# Patient Record
Sex: FEMALE | Race: WHITE | NOT HISPANIC OR LATINO | Employment: OTHER | URBAN - METROPOLITAN AREA
[De-identification: names, ages, dates, MRNs, and addresses within clinical notes are randomized per-mention and may not be internally consistent; named-entity substitution may affect disease eponyms.]

---

## 2017-01-16 PROBLEM — Z91.81 RISK FOR FALLS: Status: ACTIVE | Noted: 2017-01-16

## 2017-04-04 PROBLEM — D49.4 BLADDER TUMOR: Status: ACTIVE | Noted: 2017-04-04

## 2017-04-04 PROBLEM — N32.9 LESION OF BLADDER: Status: ACTIVE | Noted: 2017-04-04

## 2017-04-04 PROBLEM — R31.21 ASYMPTOMATIC MICROSCOPIC HEMATURIA: Status: ACTIVE | Noted: 2017-04-04

## 2019-01-29 PROBLEM — M75.102 ROTATOR CUFF SYNDROME OF LEFT SHOULDER: Status: ACTIVE | Noted: 2019-01-29

## 2019-04-23 PROBLEM — F33.41 RECURRENT MAJOR DEPRESSIVE DISORDER, IN PARTIAL REMISSION: Status: ACTIVE | Noted: 2019-04-23

## 2019-05-02 PROBLEM — R91.1 NODULE OF LOWER LOBE OF RIGHT LUNG: Status: ACTIVE | Noted: 2019-05-02

## 2019-05-02 PROBLEM — K57.30 DIVERTICULOSIS OF COLON: Status: ACTIVE | Noted: 2019-05-02

## 2019-05-02 PROBLEM — N13.30 HYDRONEPHROSIS OF RIGHT KIDNEY: Status: ACTIVE | Noted: 2019-05-02

## 2019-06-06 PROBLEM — K56.1 INTUSSUSCEPTION OF SMALL BOWEL: Status: ACTIVE | Noted: 2019-06-06

## 2019-06-06 PROBLEM — R10.32 CHRONIC LEFT LOWER QUADRANT PAIN: Status: ACTIVE | Noted: 2019-06-06

## 2019-06-06 PROBLEM — G89.29 CHRONIC LEFT LOWER QUADRANT PAIN: Status: ACTIVE | Noted: 2019-06-06

## 2019-12-23 PROBLEM — R42 VERTIGO: Status: ACTIVE | Noted: 2019-12-23

## 2020-01-12 PROBLEM — R90.89 ABNORMAL FINDING ON MRI OF BRAIN: Status: ACTIVE | Noted: 2020-01-12

## 2020-01-28 ENCOUNTER — OFFICE VISIT (OUTPATIENT)
Dept: ENDOCRINOLOGY | Facility: CLINIC | Age: 80
End: 2020-01-28
Payer: MEDICARE

## 2020-01-28 ENCOUNTER — LAB VISIT (OUTPATIENT)
Dept: LAB | Facility: HOSPITAL | Age: 80
End: 2020-01-28
Attending: INTERNAL MEDICINE
Payer: MEDICARE

## 2020-01-28 VITALS
SYSTOLIC BLOOD PRESSURE: 142 MMHG | DIASTOLIC BLOOD PRESSURE: 84 MMHG | BODY MASS INDEX: 22.41 KG/M2 | OXYGEN SATURATION: 97 % | WEIGHT: 118.69 LBS | HEIGHT: 61 IN | HEART RATE: 73 BPM

## 2020-01-28 DIAGNOSIS — E21.3 HYPERPARATHYROIDISM: ICD-10-CM

## 2020-01-28 DIAGNOSIS — F17.200 TOBACCO DEPENDENCE: Chronic | ICD-10-CM

## 2020-01-28 DIAGNOSIS — E55.9 VITAMIN D DEFICIENCY: ICD-10-CM

## 2020-01-28 DIAGNOSIS — M81.0 AGE-RELATED OSTEOPOROSIS WITHOUT CURRENT PATHOLOGICAL FRACTURE: Primary | ICD-10-CM

## 2020-01-28 DIAGNOSIS — M81.0 AGE-RELATED OSTEOPOROSIS WITHOUT CURRENT PATHOLOGICAL FRACTURE: ICD-10-CM

## 2020-01-28 PROBLEM — R90.89 ABNORMAL FINDING ON MRI OF BRAIN: Chronic | Status: ACTIVE | Noted: 2020-01-12

## 2020-01-28 PROCEDURE — 1159F PR MEDICATION LIST DOCUMENTED IN MEDICAL RECORD: ICD-10-PCS | Mod: S$GLB,,, | Performed by: INTERNAL MEDICINE

## 2020-01-28 PROCEDURE — 1126F AMNT PAIN NOTED NONE PRSNT: CPT | Mod: S$GLB,,, | Performed by: INTERNAL MEDICINE

## 2020-01-28 PROCEDURE — 83519 RIA NONANTIBODY: CPT

## 2020-01-28 PROCEDURE — 99999 PR PBB SHADOW E&M-EST. PATIENT-LVL IV: ICD-10-PCS | Mod: PBBFAC,,, | Performed by: INTERNAL MEDICINE

## 2020-01-28 PROCEDURE — 1126F PR PAIN SEVERITY QUANTIFIED, NO PAIN PRESENT: ICD-10-PCS | Mod: S$GLB,,, | Performed by: INTERNAL MEDICINE

## 2020-01-28 PROCEDURE — 3077F SYST BP >= 140 MM HG: CPT | Mod: CPTII,S$GLB,, | Performed by: INTERNAL MEDICINE

## 2020-01-28 PROCEDURE — 99204 PR OFFICE/OUTPT VISIT, NEW, LEVL IV, 45-59 MIN: ICD-10-PCS | Mod: S$GLB,,, | Performed by: INTERNAL MEDICINE

## 2020-01-28 PROCEDURE — 1101F PT FALLS ASSESS-DOCD LE1/YR: CPT | Mod: CPTII,S$GLB,, | Performed by: INTERNAL MEDICINE

## 2020-01-28 PROCEDURE — 82523 COLLAGEN CROSSLINKS: CPT | Mod: 91

## 2020-01-28 PROCEDURE — 99204 OFFICE O/P NEW MOD 45 MIN: CPT | Mod: S$GLB,,, | Performed by: INTERNAL MEDICINE

## 2020-01-28 PROCEDURE — 82523 COLLAGEN CROSSLINKS: CPT

## 2020-01-28 PROCEDURE — 3079F PR MOST RECENT DIASTOLIC BLOOD PRESSURE 80-89 MM HG: ICD-10-PCS | Mod: CPTII,S$GLB,, | Performed by: INTERNAL MEDICINE

## 2020-01-28 PROCEDURE — 1101F PR PT FALLS ASSESS DOC 0-1 FALLS W/OUT INJ PAST YR: ICD-10-PCS | Mod: CPTII,S$GLB,, | Performed by: INTERNAL MEDICINE

## 2020-01-28 PROCEDURE — 36415 COLL VENOUS BLD VENIPUNCTURE: CPT | Mod: PO

## 2020-01-28 PROCEDURE — 3079F DIAST BP 80-89 MM HG: CPT | Mod: CPTII,S$GLB,, | Performed by: INTERNAL MEDICINE

## 2020-01-28 PROCEDURE — 3077F PR MOST RECENT SYSTOLIC BLOOD PRESSURE >= 140 MM HG: ICD-10-PCS | Mod: CPTII,S$GLB,, | Performed by: INTERNAL MEDICINE

## 2020-01-28 PROCEDURE — 99999 PR PBB SHADOW E&M-EST. PATIENT-LVL IV: CPT | Mod: PBBFAC,,, | Performed by: INTERNAL MEDICINE

## 2020-01-28 PROCEDURE — 1159F MED LIST DOCD IN RCRD: CPT | Mod: S$GLB,,, | Performed by: INTERNAL MEDICINE

## 2020-01-28 RX ORDER — ALENDRONATE SODIUM 70 MG/1
70 TABLET ORAL
Qty: 12 TABLET | Refills: 6 | Status: SHIPPED | OUTPATIENT
Start: 2020-01-28 | End: 2021-06-28

## 2020-01-28 RX ORDER — ERGOCALCIFEROL 1.25 MG/1
CAPSULE ORAL
Qty: 25 CAPSULE | OUTPATIENT
Start: 2020-01-28

## 2020-01-28 RX ORDER — ERGOCALCIFEROL 1.25 MG/1
CAPSULE ORAL
Qty: 16 CAPSULE | Refills: 0 | Status: SHIPPED | OUTPATIENT
Start: 2020-01-28 | End: 2021-08-05 | Stop reason: CLARIF

## 2020-01-28 NOTE — PATIENT INSTRUCTIONS
Start vitamin D twice a week.    Start Tums 750 mg (one tablet) daily.    Start Fosamax in 2 weeks.    Get repeat vitamin d level in 2 months.    Do 24 hour urine test.

## 2020-01-28 NOTE — ASSESSMENT & PLAN NOTE
Spent >3 minutes counseling pt on the negative health effects of smoking. She is not ready to quit. Encouraged smoking cessation.

## 2020-01-28 NOTE — ASSESSMENT & PLAN NOTE
Risks factors include  race, menopause, smoking, ETOH use, and hyperparathyroidism.  In the past, pt not compliant with PO Fosamax. Discussed importance of adherence.   Will resume alendronate. If nono-compliance is an issue in the future, discussed IV reclast.  Discussed how to take the medication. Pt should take in am on an empty stomach (no food or other meds) with full glass of water and remain upright for at least 30 minutes.   Discussed calcium (8081-7518 mg daily, calcium from food sources preferred ) + Vitamin D 50KBID for 8 weeks and weight bearing/muscle strengthening exercises.   Discussed fall precautions.   Discussed side effects of bisphosphonates including but not limited to:  - GERD, esophagitis  - ONJ- 1 in 10,000-100,000 patient years. Risk seen in long term use  - AFF: 100 per 100,000 patient years with longer term use. Call with thigh or groin pain  Also discussed doing a drug holiday after usually 5 or 10 years.   Alerted that if dental work needs to be done it should be done prior to initiating therapy. She notes a bottom tooth which has been hurting with cold/hot sensitivity.

## 2020-01-28 NOTE — ASSESSMENT & PLAN NOTE
Start ergocalciferol BID for 8 week. Afterwards, start cholecalciferol 2000 IU daily. Recheck levels in 2 months

## 2020-01-28 NOTE — PROGRESS NOTES
Subjective:    Patient ID:  Camila Steve is a 79 y.o. female.    Chief Complaint:  Hyperparathyroidism and Osteoporosis      Pt presents to establish care for osteoporosis and vitamin D deficiency.    For osteoporosis, she took Fosamax in the past. Recalls taking Fosamax in the past. Stopped taking b/c she ran out and did not get a refill. No side effects while on the medication. Notes she occasionally has GERD symptoms now. No history kidney stones. History of multiple fractures: fractured wrist in 3 places after falling down some steps in the rain, broke R ankle a few times but did not require surgery. Has muscular dystrophy and trips often. Does not take calcium. Eats cheese but dose not consume a diet high in calcium. Notes she is lactose intolerant. Not currently taking vitamin D. Does not like to take pills. Smokes for >50 years. She likes it and does not want to cut back. FH of osteoporosis in mom.      Review of Systems   Constitutional: Negative for unexpected weight change.   Eyes: Positive for visual disturbance.        H/o cataracts with intermittent blurred vision.   Respiratory: Positive for shortness of breath.    Cardiovascular: Negative for chest pain.   Gastrointestinal: Positive for abdominal pain.   Musculoskeletal: Negative for myalgias.   Skin: Negative for wound.   Neurological: Positive for numbness. Negative for headaches.   Hematological: Does not bruise/bleed easily.   Psychiatric/Behavioral: Negative for sleep disturbance.        Past Medical History:   Diagnosis Date    Blood pressure elevated without history of HTN     Cancer     renal cell    Cervicalgia     Charcot-Khadijah-Tooth disease     COPD (chronic obstructive pulmonary disease)     Depression     Emphysema (subcutaneous) (surgical) resulting from a procedure     Esophageal reflux     Foot pain     High cholesterol     Hyperlipidemia     Hypertension     Low back pain     Malaise and fatigue     Osteopenia   "   Pleurisy     Pneumonia     Rhinitis, allergic     Snoring     Tobacco abuse     Vibrio cholera infection     Wrist fracture, left 12/2016    patient slipped on steps and fx (L) wrist      Social History     Tobacco Use    Smoking status: Current Every Day Smoker     Packs/day: 0.75     Years: 50.00     Pack years: 37.50     Types: Cigarettes    Smokeless tobacco: Never Used   Substance Use Topics    Alcohol use: Yes     Alcohol/week: 14.0 standard drinks     Types: 14 Shots of liquor per week     Frequency: 4 or more times a week     Drinks per session: 3 or 4    Drug use: Never     Family History   Problem Relation Age of Onset    Osteoporosis Mother     Breast cancer Maternal Cousin 35    Cancer Maternal Cousin       Past Surgical History:   Procedure Laterality Date    APPENDECTOMY      COLONOSCOPY      DIAGNOSTIC LAPAROSCOPY N/A 6/6/2019    Procedure: LAPAROSCOPY, DIAGNOSTIC;  Surgeon: Adelfo Bera MD;  Location: [facility];  Service: General;  Laterality: N/A;    HYSTERECTOMY      Partial @ age 48    KIDNEY SURGERY Left 06/2017    removal of cancerous tumor          Current Outpatient Medications:     lisinopril 10 MG tablet, TAKE 1 TABLET(10 MG) BY MOUTH EVERY DAY, Disp: 30 tablet, Rfl: 11    meclizine (ANTIVERT) 25 mg tablet, 1/2-1 tid prn dizziness, Disp: 90 tablet, Rfl: 1    rosuvastatin (CRESTOR) 40 MG Tab, Take 1 tablet (40 mg total) by mouth once daily., Disp: 30 tablet, Rfl: 11    sertraline (ZOLOFT) 100 MG tablet, GIVE "ZOEY" 2 TABLETS BY MOUTH ONCE DAILY, Disp: 60 tablet, Rfl: 11    traZODone (DESYREL) 50 MG tablet, TAKE 1 TABLET BY MOUTH EVERY NIGHT AT BEDTIME FOR DEPRESSION AND REST, Disp: 90 tablet, Rfl: 3    albuterol 90 mcg/actuation inhaler, Inhale 2 puffs into the lungs every 6 (six) hours as needed. (Patient not taking: Reported on 1/28/2020), Disp: 1 Inhaler, Rfl: 11    alendronate (FOSAMAX) 70 MG tablet, Take 1 tablet (70 mg total) by mouth every 7 days. Take " "with a full glass of water & remain upright for at least 30 minutes, Disp: 12 tablet, Rfl: 6    ALPRAZolam (XANAX) 0.25 MG tablet, Take 6 tab 45 min before mri, and after sign paperwork. (Patient not taking: Reported on 1/28/2020), Disp: 10 tablet, Rfl: 0    calcium carbonate (TUMS) 300 mg (750 mg) Chew, Take 1 tablet by mouth once daily., Disp: , Rfl:     ergocalciferol (ERGOCALCIFEROL) 50,000 unit Cap, Take one tablet twice weekly (ie Monday and Friday) for 8 weeks., Disp: 16 capsule, Rfl: 0    HYDROcodone-acetaminophen (NORCO) 5-325 mg per tablet, Take 1 tablet by mouth every 6 (six) hours as needed. (Patient not taking: Reported on 1/28/2020), Disp: 20 tablet, Rfl: 0    mupirocin calcium 2% nasl oint (BACTROBAN) 2 % Oint, by Nasal route 2 (two) times daily., Disp: , Rfl:     ondansetron (ZOFRAN-ODT) 8 MG TbDL, Take 1 tablet (8 mg total) by mouth every 6 (six) hours as needed. (Patient not taking: Reported on 1/28/2020), Disp: 10 tablet, Rfl: 0    Current Facility-Administered Medications:     ceFOXItin 2 g/50ml Dextrose IVPB, 2 g, Intravenous, On Call Procedure, Adelfo Bear MD     Review of patient's allergies indicates:   Allergen Reactions    Amoxicillin Itching     itching    Bactrim [sulfamethoxazole-trimethoprim] Other (See Comments)    Ciprofloxacin Other (See Comments)    Doxycycline Other (See Comments)    Levaquin [levofloxacin] Other (See Comments)    Penicillins Itching    Percocet [oxycodone-acetaminophen] Anxiety        Objective:   BP (!) 142/84   Pulse 73   Ht 5' 1" (1.549 m)   Wt 53.8 kg (118 lb 11.5 oz)   SpO2 97%   BMI 22.43 kg/m²   BP Readings from Last 3 Encounters:   01/28/20 (!) 142/84   01/14/20 118/72   12/03/19 (!) 152/86     Wt Readings from Last 3 Encounters:   01/28/20 1015 53.8 kg (118 lb 11.5 oz)   01/14/20 1359 53.5 kg (118 lb)   01/08/20 1302 51.7 kg (114 lb)          Physical Exam   Constitutional: She is oriented to person, place, and time. She appears " well-developed. No distress.   HENT:   Head: Normocephalic and atraumatic.   Nose: Nose normal.   Mouth/Throat: Oropharynx is clear and moist.   Neck: No tracheal deviation present. No thyromegaly present.   Cardiovascular: Normal rate, regular rhythm and normal heart sounds.   No murmur heard.  Pulmonary/Chest: Effort normal and breath sounds normal. She has no wheezes. She has no rales.   Abdominal: Soft. Bowel sounds are normal.   Musculoskeletal: She exhibits no edema.   Lymphadenopathy:     She has no cervical adenopathy.   Neurological: She is alert and oriented to person, place, and time. No cranial nerve deficit.   Skin: Skin is warm.   Psychiatric: She has a normal mood and affect. Thought content normal.   Vitals reviewed.        Lab Results   Component Value Date     06/06/2019    K 4.2 06/06/2019     06/06/2019    CO2 27 06/06/2019    BUN 19 (H) 06/06/2019    CREATININE 0.49 (L) 12/19/2019    GLU 99 06/06/2019    AST 35 05/02/2019    ALT 29 05/02/2019    ALBUMIN 4.5 05/02/2019    PROT 7.1 05/02/2019    BILITOT 0.5 05/02/2019    WBC 5.91 06/06/2019    HGB 13.8 06/06/2019    HCT 41.0 06/06/2019    MCV 87 06/06/2019    MCH 29.3 06/06/2019     06/06/2019    MPV 9.3 06/06/2019    GRAN 3.7 05/02/2019    GRAN 60.5 05/02/2019    LYMPH 1.8 05/02/2019    LYMPH 29.4 05/02/2019    CHOL 168 04/23/2019    HDL 92 (H) 04/23/2019    LDLCALC 59.4 (L) 04/23/2019    TRIG 83 04/23/2019       No results found for: TSH, A2GSQAH, W1ZCAPG, FREET4, THYROIDAB     Thyroid Labs Latest Ref Rng & Units 5/2/2019 6/6/2019 12/19/2019   Sodium 136 - 145 mmol/L 137 142 -   Potassium 3.5 - 5.1 mmol/L 4.4 4.2 -   Chloride 95 - 110 mmol/L 101 104 -   Carbon Dioxide 22 - 31 mmol/L 29 27 -   Glucose 70 - 110 mg/dL 93 99 -   Blood Urea Nitrogen 7 - 18 mg/dL 12 19(H) -   Creatinine 0.50 - 1.40 mg/dL 0.49(L) 0.54 0.49(L)   Calcium 8.4 - 10.2 mg/dL 9.1 9.3 9.2   Total Protein 6.0 - 8.4 g/dL 7.1 - -   Albumin 3.5 - 5.2 g/dL 4.5 -  -   Total Bilirubin 0.2 - 1.3 mg/dL 0.5 - -   AST 14 - 36 U/L 35 - -   ALT 10 - 44 U/L 29 - -   Anion Gap 8 - 16 mmol/L 7(L) 11 -   eGFR (African American) >60 mL/min/1.73 m:2 >60 >60 >60   eGFR (Non-African American) >60 mL/min/1.73 m:2 >60 >60 >60   WBC 3.90 - 12.70 K/uL - 5.91 -   RBC 4.00 - 5.40 M/uL - 4.71 -   Hemoglobin 12.0 - 16.0 g/dL - 13.8 -   Hematocrit 37.0 - 48.5 % - 41.0 -   MCV 82 - 98 fL - 87 -   MCH 27.0 - 31.0 pg - 29.3 -   MCHC 32.0 - 36.0 g/dL - 33.7 -   RDW 11.5 - 14.5 % - 13.1 -   Platelets 150 - 350 K/uL - 172 -   MPV 9.2 - 12.9 fL - 9.3 -   Gran # 1.8 - 7.7 K/uL - - -   Lymph # 1.0 - 4.8 K/uL - - -   Mono # 0.3 - 1.0 K/uL - - -   Eos # 0.0 - 0.5 K/uL - - -   Baso # 0.00 - 0.20 K/uL - - -   Gran % 38.0 - 73.0 % - - -   Lymph % 18.0 - 48.0 % - - -   Mono% 4.0 - 15.0 % - - -   Eos % 0.0 - 8.0 % - - -   Baso % 0.0 - 1.9 % - - -   INR - - 1.0 -   aPTT 24.6 - 36.7 sec - 32.3 -        DXA Bone Density Spine And Hip   Order: [id number]   Status:  Final result   Visible to patient:  No (Not Released) Next appt:  01/30/2020 at 10:30 AM in Radiology ([facility]) Dx:  Osteoporosis, unspecified osteoporosi...   Details     Reading Physician Reading Date Result Priority   Joselo Deluna MD 1/18/2019       Narrative     EXAMINATION:  DEXA BONE DENSITY SPINE HIP01/18/2019    CLINICAL HISTORY:  Screening, hysterectomy      DEXA images are obtained. FRAX measurement is provided.    COMPARISON:  07/01/2016 although with different technique    FINDINGS:  Spine bone mineral density is 0.791 g/cm 2 with T-score -2.3 and Z-score 0.2.  This compares to previous bone mineral density measurement of 0.864 and T-score of -2.6.    Femoral bone mineral density measurement is 0.624 g/cm 2 with T-score -2.6 and Z-score 0.7.  This compares to previous bone mineral density measurement of 0.705 and T-score of -2.4.    FRAX measurement is provided of 10 year major osteoporotic fracture 45 % and hip fracture 36 %.       Impression       Bone mineral density measurements correspond to osteoporotic overall category.  Fracture risk is high.  In the interval, both bone mineral density measurements are slightly decreased.             Assessment and plan:       Problem List Items Addressed This Visit        Endocrine    Vitamin D deficiency (Chronic)    Current Assessment & Plan     Start ergocalciferol BID for 8 week. Afterwards, start cholecalciferol 2000 IU daily. Recheck levels in 2 months         Relevant Medications    ergocalciferol (ERGOCALCIFEROL) 50,000 unit Cap    Hyperparathyroidism (Chronic)    Current Assessment & Plan     ? Primary vs secondary. PTH mildly elevated and pt normocalcemic. Vitamin D is low. Replace vitamin D and monitor PTH. Check 24 hour urine calcium. Recheck in 6 months.         Relevant Medications    ergocalciferol (ERGOCALCIFEROL) 50,000 unit Cap       Orthopedic    Age-related osteoporosis without current pathological fracture - Primary    Current Assessment & Plan     Risks factors include  race, menopause, smoking, ETOH use, and hyperparathyroidism.  In the past, pt not compliant with PO Fosamax. Discussed importance of adherence.   Will resume alendronate. If nono-compliance is an issue in the future, discussed IV reclast.  Discussed how to take the medication. Pt should take in am on an empty stomach (no food or other meds) with full glass of water and remain upright for at least 30 minutes.   Discussed calcium (4162-9910 mg daily, calcium from food sources preferred ) + Vitamin D 50KBID for 8 weeks and weight bearing/muscle strengthening exercises.   Discussed fall precautions.   Discussed side effects of bisphosphonates including but not limited to:  - GERD, esophagitis  - ONJ- 1 in 10,000-100,000 patient years. Risk seen in long term use  - AFF: 100 per 100,000 patient years with longer term use. Call with thigh or groin pain  Also discussed doing a drug holiday after usually 5 or 10  years.   Alerted that if dental work needs to be done it should be done prior to initiating therapy. She notes a bottom tooth which has been hurting with cold/hot sensitivity.         Relevant Medications    ergocalciferol (ERGOCALCIFEROL) 50,000 unit Cap    alendronate (FOSAMAX) 70 MG tablet    Other Relevant Orders    C Telopeptide (CTX), Serum    Calcium, Timed Urine    N-Telopeptide, Serum    Procollagen Type 1 Propeptide    Creatinine, urine, timed 24 Hours    Vitamin D       Other    Tobacco dependence (Chronic)    Current Assessment & Plan     Spent >3 minutes counseling pt on the negative health effects of smoking. She is not ready to quit. Encouraged smoking cessation.              Follow up:  Get repeat vitamin d level in 2 months.  Start Fosmax in 2 weeks.  RTC in 6 months

## 2020-01-28 NOTE — ASSESSMENT & PLAN NOTE
? Primary vs secondary. PTH mildly elevated and pt normocalcemic. Vitamin D is low. Replace vitamin D and monitor PTH. Check 24 hour urine calcium. Recheck in 6 months.

## 2020-01-28 NOTE — LETTER
January 28, 2020      SAY Jasso Jr., MD  [address]  [address]  [facility] [address]           [facility]  [address] [place] [address]  Phone: [phone]  Fax: [phone]          Patient: Camila Steve   MR Number: [id number]   YOB: 1940   Date of Visit: 1/28/2020       Dear Dr. SAY Jasso Jr.:    Thank you for referring Camila Steve to me for evaluation. Attached you will find relevant portions of my assessment and plan of care.    If you have questions, please do not hesitate to call me. I look forward to following Camila Steve along with you.    Sincerely,    Juliette L. Sandifer Kum-Nji, MD    Enclosure  CC:  No Recipients    If you would like to receive this communication electronically, please contact [email or web address] or [phone] to request more information on [email or web address] Link access.    For providers and/or their staff who would like to refer a patient to [facility], please contact us through our one-stop-shop provider referral line, [facility], at [phone].    If you feel you have received this communication in error or would no longer like to receive these types of communications, please e-mail [email or web address]

## 2020-01-30 LAB — COLLAGEN CTX SERPL-MCNC: 529 PG/ML

## 2020-02-04 LAB — PROCOLLAGEN TYPE 1 PROPEPTIDE: 62 MCG/L

## 2020-02-06 LAB — NTX TELOPEPTIDE: 16.6 NM BCE

## 2020-02-14 PROBLEM — I67.9: Status: ACTIVE | Noted: 2020-02-14

## 2020-02-14 PROBLEM — C80.1: Status: ACTIVE | Noted: 2020-02-14

## 2020-02-14 PROBLEM — I67.9: Chronic | Status: ACTIVE | Noted: 2020-02-14

## 2020-02-23 ENCOUNTER — TELEPHONE (OUTPATIENT)
Dept: ENDOCRINOLOGY | Facility: CLINIC | Age: 80
End: 2020-02-23

## 2020-02-24 ENCOUNTER — TELEPHONE (OUTPATIENT)
Dept: ENDOCRINOLOGY | Facility: CLINIC | Age: 80
End: 2020-02-24

## 2020-02-24 NOTE — TELEPHONE ENCOUNTER
----- Message from Anson Jason sent at 2/24/2020 11:52 AM CST -----  Type:  Patient Returning Call    Who Called: Patient  Who Left Message for Patient:  Tina  Does the patient know what this is regarding?:  NA  Best Call Back Number:  [phone]  Additional Information:

## 2021-08-13 PROBLEM — G71.00 MUSCULAR DYSTROPHY: Status: ACTIVE | Noted: 2021-08-13

## 2021-08-13 PROBLEM — R06.09 DYSPNEA ON EXERTION: Status: ACTIVE | Noted: 2021-08-13

## 2022-09-17 PROBLEM — K57.30 DIVERTICULOSIS OF COLON: Chronic | Status: ACTIVE | Noted: 2019-05-02

## 2022-09-17 PROBLEM — K56.1 INTUSSUSCEPTION OF SMALL BOWEL: Chronic | Status: ACTIVE | Noted: 2019-06-06

## 2022-09-17 PROBLEM — N13.30 HYDRONEPHROSIS OF RIGHT KIDNEY: Chronic | Status: ACTIVE | Noted: 2019-05-02

## 2022-09-17 PROBLEM — N32.9 LESION OF BLADDER: Chronic | Status: ACTIVE | Noted: 2017-04-04

## 2022-09-17 PROBLEM — M75.102 ROTATOR CUFF SYNDROME OF LEFT SHOULDER: Chronic | Status: ACTIVE | Noted: 2019-01-29

## 2022-09-17 PROBLEM — R31.21 ASYMPTOMATIC MICROSCOPIC HEMATURIA: Chronic | Status: ACTIVE | Noted: 2017-04-04

## 2022-09-23 PROBLEM — G71.00 MUSCULAR DYSTROPHY: Chronic | Status: ACTIVE | Noted: 2021-08-13

## 2022-11-10 PROBLEM — C80.1: Chronic | Status: ACTIVE | Noted: 2020-02-14

## 2022-11-10 PROBLEM — I67.9: Chronic | Status: ACTIVE | Noted: 2020-02-14

## 2022-11-10 PROBLEM — F33.41 RECURRENT MAJOR DEPRESSIVE DISORDER, IN PARTIAL REMISSION: Chronic | Status: ACTIVE | Noted: 2019-04-23

## 2022-11-10 PROBLEM — R91.1 NODULE OF LOWER LOBE OF RIGHT LUNG: Chronic | Status: ACTIVE | Noted: 2019-05-02

## 2022-11-18 ENCOUNTER — OFFICE VISIT (OUTPATIENT)
Dept: PAIN MEDICINE | Facility: CLINIC | Age: 82
End: 2022-11-18
Payer: MEDICARE

## 2022-11-18 VITALS
WEIGHT: 111.31 LBS | SYSTOLIC BLOOD PRESSURE: 130 MMHG | HEIGHT: 61 IN | BODY MASS INDEX: 21.02 KG/M2 | HEART RATE: 97 BPM | DIASTOLIC BLOOD PRESSURE: 79 MMHG

## 2022-11-18 DIAGNOSIS — M54.50 LUMBAR BACK PAIN: ICD-10-CM

## 2022-11-18 DIAGNOSIS — M54.16 LUMBAR RADICULOPATHY, CHRONIC: ICD-10-CM

## 2022-11-18 DIAGNOSIS — M41.9 SCOLIOSIS, UNSPECIFIED SCOLIOSIS TYPE, UNSPECIFIED SPINAL REGION: Primary | ICD-10-CM

## 2022-11-18 DIAGNOSIS — G89.29 CHRONIC BILATERAL THORACIC BACK PAIN: ICD-10-CM

## 2022-11-18 DIAGNOSIS — M54.6 CHRONIC BILATERAL THORACIC BACK PAIN: ICD-10-CM

## 2022-11-18 PROCEDURE — 3288F PR FALLS RISK ASSESSMENT DOCUMENTED: ICD-10-PCS | Mod: CPTII,S$GLB,, | Performed by: PHYSICIAN ASSISTANT

## 2022-11-18 PROCEDURE — 3075F PR MOST RECENT SYSTOLIC BLOOD PRESS GE 130-139MM HG: ICD-10-PCS | Mod: CPTII,S$GLB,, | Performed by: PHYSICIAN ASSISTANT

## 2022-11-18 PROCEDURE — 1101F PR PT FALLS ASSESS DOC 0-1 FALLS W/OUT INJ PAST YR: ICD-10-PCS | Mod: CPTII,S$GLB,, | Performed by: PHYSICIAN ASSISTANT

## 2022-11-18 PROCEDURE — 99999 PR PBB SHADOW E&M-EST. PATIENT-LVL V: ICD-10-PCS | Mod: PBBFAC,,, | Performed by: PHYSICIAN ASSISTANT

## 2022-11-18 PROCEDURE — 1101F PT FALLS ASSESS-DOCD LE1/YR: CPT | Mod: CPTII,S$GLB,, | Performed by: PHYSICIAN ASSISTANT

## 2022-11-18 PROCEDURE — 99204 OFFICE O/P NEW MOD 45 MIN: CPT | Mod: S$GLB,,, | Performed by: PHYSICIAN ASSISTANT

## 2022-11-18 PROCEDURE — 1125F AMNT PAIN NOTED PAIN PRSNT: CPT | Mod: CPTII,S$GLB,, | Performed by: PHYSICIAN ASSISTANT

## 2022-11-18 PROCEDURE — 3288F FALL RISK ASSESSMENT DOCD: CPT | Mod: CPTII,S$GLB,, | Performed by: PHYSICIAN ASSISTANT

## 2022-11-18 PROCEDURE — 3078F DIAST BP <80 MM HG: CPT | Mod: CPTII,S$GLB,, | Performed by: PHYSICIAN ASSISTANT

## 2022-11-18 PROCEDURE — 1159F MED LIST DOCD IN RCRD: CPT | Mod: CPTII,S$GLB,, | Performed by: PHYSICIAN ASSISTANT

## 2022-11-18 PROCEDURE — 99204 PR OFFICE/OUTPT VISIT, NEW, LEVL IV, 45-59 MIN: ICD-10-PCS | Mod: S$GLB,,, | Performed by: PHYSICIAN ASSISTANT

## 2022-11-18 PROCEDURE — 1159F PR MEDICATION LIST DOCUMENTED IN MEDICAL RECORD: ICD-10-PCS | Mod: CPTII,S$GLB,, | Performed by: PHYSICIAN ASSISTANT

## 2022-11-18 PROCEDURE — 99999 PR PBB SHADOW E&M-EST. PATIENT-LVL V: CPT | Mod: PBBFAC,,, | Performed by: PHYSICIAN ASSISTANT

## 2022-11-18 PROCEDURE — 3075F SYST BP GE 130 - 139MM HG: CPT | Mod: CPTII,S$GLB,, | Performed by: PHYSICIAN ASSISTANT

## 2022-11-18 PROCEDURE — 1125F PR PAIN SEVERITY QUANTIFIED, PAIN PRESENT: ICD-10-PCS | Mod: CPTII,S$GLB,, | Performed by: PHYSICIAN ASSISTANT

## 2022-11-18 PROCEDURE — 3078F PR MOST RECENT DIASTOLIC BLOOD PRESSURE < 80 MM HG: ICD-10-PCS | Mod: CPTII,S$GLB,, | Performed by: PHYSICIAN ASSISTANT

## 2022-11-18 NOTE — PROGRESS NOTES
Back and Spine Consult    Patient ID: Camila Steve is a 82 y.o. female.    Chief Complaint   Patient presents with    Low-back Pain     For all of this year right-sided lbp that radiates into right hip and up to between the shoulder blades.       Review of Systems   Constitutional:  Negative for activity change, appetite change, chills, fever and unexpected weight change.   HENT:  Negative for tinnitus, trouble swallowing and voice change.    Respiratory:  Negative for apnea, cough, chest tightness and shortness of breath.    Cardiovascular:  Negative for chest pain and palpitations.   Gastrointestinal:  Negative for constipation, diarrhea, nausea and vomiting.   Genitourinary:  Negative for difficulty urinating, dysuria, frequency and urgency.   Musculoskeletal:  Positive for back pain and myalgias. Negative for gait problem, neck pain and neck stiffness.   Skin:  Negative for wound.   Neurological:  Negative for dizziness, tremors, seizures, facial asymmetry, speech difficulty, weakness, light-headedness, numbness and headaches.   Psychiatric/Behavioral:  Negative for confusion and decreased concentration.      Past Medical History:   Diagnosis Date    Blood pressure elevated without history of HTN     Cancer     renal cell    Cervicalgia     Charcot-Khadijah-Tooth disease     COPD (chronic obstructive pulmonary disease)     Depression     Emphysema (subcutaneous) (surgical) resulting from a procedure     Esophageal reflux     Foot pain     High cholesterol     Hyperlipidemia     Hypertension     Low back pain     Malaise and fatigue     Muscular dystrophy     Osteopenia     Pleurisy     Pneumonia     Respiratory distress     Rhinitis, allergic     Snoring     Tobacco abuse     Vibrio cholera infection     Wrist fracture, left 12/2016    patient slipped on steps and fx (L) wrist     Social History     Socioeconomic History    Marital status:    Tobacco Use    Smoking status: Every Day     Packs/day: 0.50      Years: 50.00     Pack years: 25.00     Types: Cigarettes     Start date: 1963    Smokeless tobacco: Never   Substance and Sexual Activity    Alcohol use: Yes     Alcohol/week: 14.0 standard drinks     Types: 14 Shots of liquor per week    Drug use: Never    Sexual activity: Not Currently     Partners: Male     Birth control/protection: Post-menopausal, None, See Surgical Hx     Family History   Problem Relation Age of Onset    Osteoporosis Mother     Breast cancer Maternal Cousin 35    Cancer Maternal Cousin      Review of patient's allergies indicates:   Allergen Reactions    Amoxicillin Itching     itching    Bactrim [sulfamethoxazole-trimethoprim] Other (See Comments)    Ciprofloxacin Other (See Comments)    Clindamycin Nausea Only    Doxycycline Other (See Comments)    Levaquin [levofloxacin] Other (See Comments)    Penicillins Itching    Percocet [oxycodone-acetaminophen] Anxiety     Nausea and vomiting       Current Outpatient Medications:     fluticasone-umeclidin-vilanter (TRELEGY ELLIPTA) 100-62.5-25 mcg DsDv, INHALE 1 PUFF INTO THE LUNGS EVERY MORNING, Disp: 60 each, Rfl: 0    lisinopriL (PRINIVIL,ZESTRIL) 40 MG tablet, Take 1 tablet (40 mg total) by mouth once daily., Disp: 90 tablet, Rfl: 3    meclizine (ANTIVERT) 25 mg tablet, 1/2-1 tid prn dizziness (Patient taking differently: Take 12.5 mg by mouth 3 (three) times daily as needed. 1/2-1 tid prn dizziness), Disp: 90 tablet, Rfl: 1    rosuvastatin (CRESTOR) 20 MG tablet, TAKE 1 TABLET(20 MG) BY MOUTH ONCE DAILY, Disp: 30 tablet, Rfl: 11    sertraline (ZOLOFT) 100 MG tablet, TAKE 2 TABLETS BY MOUTH EVERY DAY, Disp: 60 tablet, Rfl: 11    traZODone (DESYREL) 50 MG tablet, TAKE 1 TABLET BY MOUTH EVERY NIGHT AT BEDTIME FOR DEPRESSION AND REST, Disp: 90 tablet, Rfl: 3    albuterol (PROVENTIL/VENTOLIN HFA) 90 mcg/actuation inhaler, Inhale 2 puffs into the lungs every 4 (four) hours as needed for Shortness of Breath. Rescue (Patient not taking: Reported on  "11/18/2022), Disp: 17 g, Rfl: 3    Vitals:    11/18/22 1411   BP: 130/79   BP Location: Right arm   Patient Position: Sitting   BP Method: Small (Automatic)   Pulse: 97   Weight: 50.5 kg (111 lb 5.3 oz)   Height: 5' 1" (1.549 m)       Physical Exam  Vitals and nursing note reviewed.   Constitutional:       Appearance: She is well-developed.   HENT:      Head: Normocephalic and atraumatic.   Eyes:      Pupils: Pupils are equal, round, and reactive to light.   Cardiovascular:      Rate and Rhythm: Normal rate.   Pulmonary:      Effort: Pulmonary effort is normal.   Musculoskeletal:         General: Normal range of motion.      Cervical back: Normal range of motion and neck supple.   Skin:     General: Skin is warm and dry.   Neurological:      Mental Status: She is alert and oriented to person, place, and time.      Coordination: Finger-Nose-Finger Test normal.      Gait: Gait is intact. Tandem walk normal.      Deep Tendon Reflexes:      Reflex Scores:       Tricep reflexes are 2+ on the right side and 2+ on the left side.       Bicep reflexes are 2+ on the right side and 2+ on the left side.       Brachioradialis reflexes are 2+ on the right side and 2+ on the left side.       Patellar reflexes are 2+ on the right side and 2+ on the left side.       Achilles reflexes are 2+ on the right side and 2+ on the left side.  Psychiatric:         Speech: Speech normal.         Behavior: Behavior normal.         Thought Content: Thought content normal.         Judgment: Judgment normal.       Neurologic Exam     Mental Status   Oriented to person, place, and time.   Oriented to person.   Oriented to place.   Oriented to time.   Attention: normal. Concentration: normal.   Speech: speech is normal   Level of consciousness: alert  Knowledge: consistent with education.     Cranial Nerves     CN III, IV, VI   Pupils are equal, round, and reactive to light.    CN XI   Right sternocleidomastoid strength: normal  Left " sternocleidomastoid strength: normal  Right trapezius strength: normal  Left trapezius strength: normal    Motor Exam   Muscle bulk: normal  Overall muscle tone: normal  Right arm tone: normal  Left arm tone: normal  Right arm pronator drift: absent  Left arm pronator drift: absent  Right leg tone: normal  Left leg tone: normal    Strength   Right neck flexion: 5/5  Left neck flexion: 5/5  Right neck extension: 5/5  Left neck extension: 5/5  Right deltoid: 5/5  Left deltoid: 5/5  Right biceps: 5/5  Left biceps: 5/5  Right triceps: 5/5  Left triceps: 5/5  Right wrist flexion: 5/5  Left wrist flexion: 5/5  Right wrist extension: 5/5  Left wrist extension: 5/5  Right interossei: 5/5  Left interossei: 5/5  Right abdominals: 5/5  Left abdominals: 5/5  Right iliopsoas: 5/5  Left iliopsoas: 5/5  Right quadriceps: 5/5  Left quadriceps: 5/5  Right hamstrin/5  Left hamstrin/5  Right glutei: 5/5  Left glutei: 5/5  Right anterior tibial: 4/5  Left anterior tibial: 5/5  Right posterior tibial: 5/5  Left posterior tibial: 5/5  Right peroneal: 4/5  Left peroneal: 5/5  Right gastroc: 5/5  Left gastroc: 5/5    Sensory Exam   Right arm light touch: normal  Left arm light touch: normal  Right leg light touch: normal  Left leg light touch: normal    Gait, Coordination, and Reflexes     Gait  Gait: normal    Coordination   Finger to nose coordination: normal  Tandem walking coordination: normal    Tremor   Resting tremor: absent  Intention tremor: absent  Action tremor: absent    Reflexes   Right brachioradialis: 2+  Left brachioradialis: 2+  Right biceps: 2+  Left biceps: 2+  Right triceps: 2+  Left triceps: 2+  Right patellar: 2+  Left patellar: 2+  Right achilles: 2+  Left achilles: 2+  Right Bernal: absent  Left Bernal: absent  Right ankle clonus: absent  Left ankle clonus: absent    Provider dictation:    82 year old female with scoliosis osteoporosis, Charcot-Khadijah-Tooth, hyperlipidemia, hypertension, depression PVD is  referred by Dr. Jasso for back pain.  She was seen in 2016 for similar symptoms and considered new today. Since 2016 symptoms have become worse.  She has pain in the right lower back that radiates across the back and up into the thoracic, interscapular region.  She has an ache in the right leg and intermittent shooting pains in the legs.  She has not been followed by podiatry or neurology.  At times she feels off balance.  She takes tylenol for pain.    Current medications:  tylenol  Medications tried:  ibuprofen - stopped due to GI bleed,  Physical therapy:  none  Interventional Procedures:  none     On physical exam, she has right foot dorsi flexor weakness at 4/5.  Otherwise, she is neurologically intact with 5/5 strength, 2+ muscle stretch reflexes and full sensation in the bilateral upper and lower extremties.No pain with axial facet loading.  No SI joint pain on palpation.  No pain with lumbar flexion/ extension.    I have reviewed an MRI of the lumbar spine from [facility] 6-22[facility]16.  The most significant findings are rotary dextroscoliosis in the lumbar spine, osteopenia, and L4/5 facet arthropathy along with anterolisthesis of L4 on L5 by 2mm results in mild central canal narrowing and right greater than left foraminal narrowing.    Xray 6-7-16 lumbar spine:  no fracture or subluxation.  There is an S-shaped lateral spine curve present with the primary curve convex to the right in the thoracolumbar junction area.  There are degenerative changes present with findings consisting of osteophyte formation and some disc space narrowing.  Evaluation is difficult due to the scoliosis.  There are atherosclerotic changes also noted involving the aorta and the iliac arteries.    MRI 6-22-16 lumbar spine:  Spondylosis with mild spinal stenosis L4-L5 disc space.  Rotatory dextroscoliosis of the lumbar spine as above.  Multilevel facet arthropathy.    CT Abdomen and Pelvis from 10-6-22 reviewed.  The visualized portions of the  spine reveal:  thoracolumbar dextroscoliosis.      Ms. Collazo has chornic back pain with some radicular symptoms into the legs.  Pain is myoafascial and arthritis related to scoliosis and degenerative chagnes.  I recommend prn usage for TLSO brace for back pain to help during periods of increased activity.  I recommend PT.  We will also get an MRI of the thoracic and lumbar spine and have her follow up with pain managemnet physician to discuss interventional procedures to help with pain. She is agreeable to plan.  We discussed neurology and podiatry refereral and she will consider seeing them as well.  Follow up with me as needed.     Visit Diagnosis:  Scoliosis, unspecified scoliosis type, unspecified spinal region  -     Ambulatory referral/consult to Physical/Occupational Therapy; Future; Expected date: 11/25/2022  -     Back/Cervical Brace For Home Use  -     MRI Thoracic Spine Without Contrast; Future; Expected date: 11/18/2022  -     MRI Lumbar Spine Without Contrast; Future; Expected date: 11/18/2022    Lumbar radiculopathy, chronic  -     Ambulatory referral/consult to [facility] Spine Clinic    Chronic bilateral thoracic back pain  -     Ambulatory referral/consult to Physical/Occupational Therapy; Future; Expected date: 11/25/2022  -     Back/Cervical Brace For Home Use  -     MRI Thoracic Spine Without Contrast; Future; Expected date: 11/18/2022  -     MRI Lumbar Spine Without Contrast; Future; Expected date: 11/18/2022    Lumbar back pain  -     Ambulatory referral/consult to Physical/Occupational Therapy; Future; Expected date: 11/25/2022  -     Back/Cervical Brace For Home Use  -     MRI Thoracic Spine Without Contrast; Future; Expected date: 11/18/2022  -     MRI Lumbar Spine Without Contrast; Future; Expected date: 11/18/2022      Total time spent counseling greater than fifty percent of total visit time.  Counseling included discussion regarding imaging findings, diagnosis possibilities, treatment  options, risks and benefits.   The patient had many questions regarding the options and long-term effects.

## 2022-12-14 ENCOUNTER — TELEPHONE (OUTPATIENT)
Dept: PAIN MEDICINE | Facility: CLINIC | Age: 82
End: 2022-12-14
Payer: MEDICARE

## 2022-12-14 NOTE — TELEPHONE ENCOUNTER
Clemencia spoke with Ms. Steve and she said she forgot about the appointment with Dr. Sutherland. She has been very busy and would like to wait until after the holidays to reschedule. She would like a call with the   results.

## 2022-12-14 NOTE — TELEPHONE ENCOUNTER
----- Message from Marizol Blackwell PA-C sent at 12/9/2022  3:42 PM CST -----  MRI thoracic and lumbar spine complete.  She was suupposed to see Dr. Sutherland yesterday, but did not show.  Please see if she wants to reschedule.

## 2022-12-14 NOTE — TELEPHONE ENCOUNTER
I spoke with Ms. Steve and gave her the results of the imaging as per Marizol Blackwell and Marizol's recommendations for follow up with Dr. Sutherland and the patients PCP, she indicated understanding.

## 2022-12-14 NOTE — TELEPHONE ENCOUNTER
She had an MRI of the thoracic and lumbar spine.  She has scoliosis and arthritis in her spine.  Dr. Sutherland can discuss what procedures he may be able to offer to help her when she has her appointment.  She should follow up with her pcp regarding some right kidney abnormalities - hydronephrosis - and if anything further needs to be done.

## 2023-01-05 ENCOUNTER — TELEPHONE (OUTPATIENT)
Dept: NEPHROLOGY | Facility: CLINIC | Age: 83
End: 2023-01-05

## 2023-01-05 DIAGNOSIS — N13.30 HYDRONEPHROSIS, UNSPECIFIED HYDRONEPHROSIS TYPE: Primary | ICD-10-CM

## 2023-01-05 NOTE — TELEPHONE ENCOUNTER
Called to offer sooner appt.  Scheduled Friday.   She does not have or see a urologist at this time.  Please advise.     Imaging from Nov:     Impression:     Moderate severe right-sided hydronephrosis of uncertain etiology.  Recommend further assessment.

## 2023-01-06 ENCOUNTER — TELEPHONE (OUTPATIENT)
Dept: UROLOGY | Facility: CLINIC | Age: 83
End: 2023-01-06
Payer: MEDICARE

## 2023-01-06 ENCOUNTER — OFFICE VISIT (OUTPATIENT)
Dept: NEPHROLOGY | Facility: CLINIC | Age: 83
End: 2023-01-06
Payer: MEDICARE

## 2023-01-06 VITALS
WEIGHT: 111 LBS | BODY MASS INDEX: 20.96 KG/M2 | DIASTOLIC BLOOD PRESSURE: 64 MMHG | SYSTOLIC BLOOD PRESSURE: 112 MMHG | HEIGHT: 61 IN

## 2023-01-06 DIAGNOSIS — N13.30 HYDRONEPHROSIS, UNSPECIFIED HYDRONEPHROSIS TYPE: Primary | ICD-10-CM

## 2023-01-06 PROCEDURE — 3288F FALL RISK ASSESSMENT DOCD: CPT | Mod: CPTII,S$GLB,, | Performed by: INTERNAL MEDICINE

## 2023-01-06 PROCEDURE — 3074F PR MOST RECENT SYSTOLIC BLOOD PRESSURE < 130 MM HG: ICD-10-PCS | Mod: CPTII,S$GLB,, | Performed by: INTERNAL MEDICINE

## 2023-01-06 PROCEDURE — 1160F RVW MEDS BY RX/DR IN RCRD: CPT | Mod: CPTII,S$GLB,, | Performed by: INTERNAL MEDICINE

## 2023-01-06 PROCEDURE — 3078F DIAST BP <80 MM HG: CPT | Mod: CPTII,S$GLB,, | Performed by: INTERNAL MEDICINE

## 2023-01-06 PROCEDURE — 1160F PR REVIEW ALL MEDS BY PRESCRIBER/CLIN PHARMACIST DOCUMENTED: ICD-10-PCS | Mod: CPTII,S$GLB,, | Performed by: INTERNAL MEDICINE

## 2023-01-06 PROCEDURE — 3078F PR MOST RECENT DIASTOLIC BLOOD PRESSURE < 80 MM HG: ICD-10-PCS | Mod: CPTII,S$GLB,, | Performed by: INTERNAL MEDICINE

## 2023-01-06 PROCEDURE — 3288F PR FALLS RISK ASSESSMENT DOCUMENTED: ICD-10-PCS | Mod: CPTII,S$GLB,, | Performed by: INTERNAL MEDICINE

## 2023-01-06 PROCEDURE — 1159F MED LIST DOCD IN RCRD: CPT | Mod: CPTII,S$GLB,, | Performed by: INTERNAL MEDICINE

## 2023-01-06 PROCEDURE — 1101F PT FALLS ASSESS-DOCD LE1/YR: CPT | Mod: CPTII,S$GLB,, | Performed by: INTERNAL MEDICINE

## 2023-01-06 PROCEDURE — 99204 PR OFFICE/OUTPT VISIT, NEW, LEVL IV, 45-59 MIN: ICD-10-PCS | Mod: S$GLB,,, | Performed by: INTERNAL MEDICINE

## 2023-01-06 PROCEDURE — 1101F PR PT FALLS ASSESS DOC 0-1 FALLS W/OUT INJ PAST YR: ICD-10-PCS | Mod: CPTII,S$GLB,, | Performed by: INTERNAL MEDICINE

## 2023-01-06 PROCEDURE — 1159F PR MEDICATION LIST DOCUMENTED IN MEDICAL RECORD: ICD-10-PCS | Mod: CPTII,S$GLB,, | Performed by: INTERNAL MEDICINE

## 2023-01-06 PROCEDURE — 3074F SYST BP LT 130 MM HG: CPT | Mod: CPTII,S$GLB,, | Performed by: INTERNAL MEDICINE

## 2023-01-06 PROCEDURE — 99999 PR PBB SHADOW E&M-EST. PATIENT-LVL III: ICD-10-PCS | Mod: PBBFAC,,, | Performed by: INTERNAL MEDICINE

## 2023-01-06 PROCEDURE — 99204 OFFICE O/P NEW MOD 45 MIN: CPT | Mod: S$GLB,,, | Performed by: INTERNAL MEDICINE

## 2023-01-06 PROCEDURE — 99999 PR PBB SHADOW E&M-EST. PATIENT-LVL III: CPT | Mod: PBBFAC,,, | Performed by: INTERNAL MEDICINE

## 2023-01-06 NOTE — PROGRESS NOTES
Subjective:       Patient ID: Camila Steve is a 82 y.o. White female who presents for new patient evaluation for chronic renal failure.    Camila Steve is referred by SAY Jasso Jr, MD to be evaluated for chronic renal failure.  She had a recent MRI to evaluate LLQ abdominal pain and back pain.  Her MRI showed R hydronephrosis.  She also had a CT in October which showed a 1.3 cm cyst/mass in her right kidney.  She had a partial nephrectomy in 6/2017.  She has had a lesion on her right kidney for a couple of years now which she has been told is benign.        Review of Systems   Constitutional:  Negative for appetite change, chills and fever.   HENT:  Negative for congestion.    Eyes:  Negative for visual disturbance.   Respiratory:  Negative for cough and shortness of breath.    Cardiovascular:  Negative for chest pain and leg swelling.   Gastrointestinal:  Positive for abdominal pain. Negative for diarrhea, nausea and vomiting.   Genitourinary:  Negative for difficulty urinating, dysuria and hematuria.   Musculoskeletal:  Positive for back pain. Negative for myalgias.   Skin:  Negative for rash.   Neurological:  Negative for headaches.   Psychiatric/Behavioral:  Negative for sleep disturbance.        The past medical, family and social histories were reviewed for this encounter.     Past Medical History:   Diagnosis Date    Blood pressure elevated without history of HTN     Cancer     renal cell    Cervicalgia     Charcot-Khadijah-Tooth disease     COPD (chronic obstructive pulmonary disease)     Depression     Emphysema (subcutaneous) (surgical) resulting from a procedure     Esophageal reflux     Foot pain     High cholesterol     Hyperlipidemia     Hypertension     Low back pain     Malaise and fatigue     Muscular dystrophy     Osteopenia     Pleurisy     Pneumonia     Respiratory distress     Rhinitis, allergic     Snoring     Tobacco abuse     Vibrio cholera infection     Wrist fracture, left  12/2016    patient slipped on steps and fx (L) wrist     Past Surgical History:   Procedure Laterality Date    APPENDECTOMY      COLONOSCOPY      CORONARY ANGIOGRAPHY  8/10/2021    Procedure: ANGIOGRAM, CORONARY ARTERY;  Surgeon: Joni Reynolds MD;  Location: [facility] CATH;  Service: Cardiovascular;;    DIAGNOSTIC LAPAROSCOPY N/A 6/6/2019    Procedure: LAPAROSCOPY, DIAGNOSTIC;  Surgeon: Adelfo Bear MD;  Location: [facility] OR;  Service: General;  Laterality: N/A;    HYSTERECTOMY      Partial @ age 48    KIDNEY SURGERY Left 06/2017    removal of cancerous tumor    LEFT HEART CATHETERIZATION  8/10/2021    Procedure: Left heart cath;  Surgeon: Joni Reynolds MD;  Location: [facility] CATH;  Service: Cardiovascular;;     Social History     Socioeconomic History    Marital status:    Tobacco Use    Smoking status: Every Day     Packs/day: 0.50     Years: 50.00     Pack years: 25.00     Types: Cigarettes     Start date: 1963    Smokeless tobacco: Never   Substance and Sexual Activity    Alcohol use: Yes     Alcohol/week: 14.0 standard drinks     Types: 14 Shots of liquor per week    Drug use: Never    Sexual activity: Not Currently     Partners: Male     Birth control/protection: Post-menopausal, None, See Surgical Hx     Current Outpatient Medications   Medication Sig    albuterol (PROVENTIL/VENTOLIN HFA) 90 mcg/actuation inhaler Inhale 2 puffs into the lungs every 4 (four) hours as needed for Shortness of Breath. Rescue (Patient not taking: Reported on 11/18/2022)    fluticasone-umeclidin-vilanter (TRELEGY ELLIPTA) 100-62.5-25 mcg DsDv INHALE 1 PUFF INTO THE LUNGS EVERY MORNING    lisinopriL (PRINIVIL,ZESTRIL) 40 MG tablet Take 1 tablet (40 mg total) by mouth once daily.    meclizine (ANTIVERT) 25 mg tablet 1/2-1 tid prn dizziness (Patient taking differently: Take 12.5 mg by mouth 3 (three) times daily as needed. 1/2-1 tid prn dizziness)    rosuvastatin (CRESTOR) 20 MG tablet TAKE 1 TABLET(20 MG) BY MOUTH ONCE DAILY     "sertraline (ZOLOFT) 100 MG tablet TAKE 2 TABLETS BY MOUTH EVERY DAY    traZODone (DESYREL) 50 MG tablet TAKE 1 TABLET BY MOUTH EVERY NIGHT AT BEDTIME FOR DEPRESSION AND REST     No current facility-administered medications for this visit.       /64 (BP Location: Right arm, Patient Position: Sitting, BP Method: Medium (Manual))   Ht 5' 1" (1.549 m)   Wt 50.3 kg (111 lb)   BMI 20.97 kg/m²     Objective:      Physical Exam  Vitals reviewed.   Constitutional:       General: She is not in acute distress.     Appearance: She is well-developed.   HENT:      Head: Normocephalic and atraumatic.   Eyes:      General: No scleral icterus.     Conjunctiva/sclera: Conjunctivae normal.   Neck:      Vascular: No JVD.   Cardiovascular:      Rate and Rhythm: Normal rate and regular rhythm.      Heart sounds: Normal heart sounds. No murmur heard.    No friction rub. No gallop.   Pulmonary:      Effort: Pulmonary effort is normal. No respiratory distress.      Breath sounds: Normal breath sounds. No wheezing or rales.   Abdominal:      General: Bowel sounds are normal. There is no distension.      Palpations: Abdomen is soft.      Tenderness: There is no abdominal tenderness.   Musculoskeletal:      Cervical back: Normal range of motion.      Right lower leg: No edema.      Left lower leg: No edema.   Skin:     General: Skin is warm and dry.      Findings: No rash.   Neurological:      Mental Status: She is alert and oriented to person, place, and time.   Psychiatric:         Mood and Affect: Mood normal.         Behavior: Behavior normal.       Assessment:       1. Hydronephrosis, unspecified hydronephrosis type        Plan:   Return to clinic in 6 months.  Labs for next visit include rp.  Baseline creatinine is 0.6.  MRI shows R hydronephrosis.  CT shows a 1.3 cm mass on the right kidney.  She apparently has an appointment with  on th 18th.  I suspect she may need cystoscopy to evaluate the R hydronephrosis.  She also will " need the right renal mass assessed once again given her history or prior partial nephrectomy.

## 2023-01-06 NOTE — Clinical Note
Hello.  It looks like she has an appointment already with ino.  She made it clear she never wants to go to [facility] again.  Thanks!

## 2023-01-18 ENCOUNTER — OFFICE VISIT (OUTPATIENT)
Dept: UROLOGY | Facility: CLINIC | Age: 83
End: 2023-01-18
Payer: MEDICARE

## 2023-01-18 VITALS — WEIGHT: 110.88 LBS | BODY MASS INDEX: 20.93 KG/M2 | HEIGHT: 61 IN

## 2023-01-18 DIAGNOSIS — N13.30 HYDRONEPHROSIS, UNSPECIFIED HYDRONEPHROSIS TYPE: ICD-10-CM

## 2023-01-18 DIAGNOSIS — N28.89 RENAL MASS: Primary | ICD-10-CM

## 2023-01-18 LAB
BILIRUBIN, UA POC OHS: ABNORMAL
BLOOD, UA POC OHS: NEGATIVE
CLARITY, UA POC OHS: CLEAR
COLOR, UA POC OHS: YELLOW
GLUCOSE, UA POC OHS: NEGATIVE
KETONES, UA POC OHS: ABNORMAL
LEUKOCYTES, UA POC OHS: ABNORMAL
NITRITE, UA POC OHS: NEGATIVE
PH, UA POC OHS: 6.5
PROTEIN, UA POC OHS: 100
SPECIFIC GRAVITY, UA POC OHS: 1.02
UROBILINOGEN, UA POC OHS: 1

## 2023-01-18 PROCEDURE — 81003 URINALYSIS AUTO W/O SCOPE: CPT | Mod: QW,S$GLB,, | Performed by: UROLOGY

## 2023-01-18 PROCEDURE — 1101F PT FALLS ASSESS-DOCD LE1/YR: CPT | Mod: CPTII,S$GLB,, | Performed by: UROLOGY

## 2023-01-18 PROCEDURE — 1159F PR MEDICATION LIST DOCUMENTED IN MEDICAL RECORD: ICD-10-PCS | Mod: CPTII,S$GLB,, | Performed by: UROLOGY

## 2023-01-18 PROCEDURE — 99204 OFFICE O/P NEW MOD 45 MIN: CPT | Mod: S$GLB,,, | Performed by: UROLOGY

## 2023-01-18 PROCEDURE — 1101F PR PT FALLS ASSESS DOC 0-1 FALLS W/OUT INJ PAST YR: ICD-10-PCS | Mod: CPTII,S$GLB,, | Performed by: UROLOGY

## 2023-01-18 PROCEDURE — 99999 PR PBB SHADOW E&M-EST. PATIENT-LVL III: ICD-10-PCS | Mod: PBBFAC,,, | Performed by: UROLOGY

## 2023-01-18 PROCEDURE — 3288F FALL RISK ASSESSMENT DOCD: CPT | Mod: CPTII,S$GLB,, | Performed by: UROLOGY

## 2023-01-18 PROCEDURE — 81003 POCT URINALYSIS(INSTRUMENT): ICD-10-PCS | Mod: QW,S$GLB,, | Performed by: UROLOGY

## 2023-01-18 PROCEDURE — 99204 PR OFFICE/OUTPT VISIT, NEW, LEVL IV, 45-59 MIN: ICD-10-PCS | Mod: S$GLB,,, | Performed by: UROLOGY

## 2023-01-18 PROCEDURE — 1159F MED LIST DOCD IN RCRD: CPT | Mod: CPTII,S$GLB,, | Performed by: UROLOGY

## 2023-01-18 PROCEDURE — 3288F PR FALLS RISK ASSESSMENT DOCUMENTED: ICD-10-PCS | Mod: CPTII,S$GLB,, | Performed by: UROLOGY

## 2023-01-18 PROCEDURE — 99999 PR PBB SHADOW E&M-EST. PATIENT-LVL III: CPT | Mod: PBBFAC,,, | Performed by: UROLOGY

## 2023-01-18 RX ORDER — BISMUTH SUBSALICYLATE 262 MG/1
1 TABLET ORAL 4 TIMES DAILY
COMMUNITY
Start: 2023-01-11 | End: 2023-01-18

## 2023-01-18 RX ORDER — AMOXICILLIN 500 MG/1
500 CAPSULE ORAL 4 TIMES DAILY
COMMUNITY
Start: 2023-01-11 | End: 2023-01-18

## 2023-01-18 NOTE — PROGRESS NOTES
Subjective:       Patient ID: Camila Steve is a 82 y.o. female.    Chief Complaint: Abdominal Pain    HPI    82-year-old with right-sided hydronephrosis.  She has a complicated urologic history.  She is had history of bilateral renal lesions.  She underwent percutaneous ablation of a left renal lesion in 2017 at [facility].  The details are unknown but she was told the lesion was benign.  She also has a small exophytic partially enhancing right lower pole lesion.  It measures 1.3 cm and has been present since imaging in 2017 is not significantly changed.  Her renal function is normal with creatinine of 0.6 and GFR greater than 60.  She has severe scoliosis and chronic right hip pain.  On the recent CT scan she was noted to have mild dilation of the right collecting system with a normal ureter.  I reviewed all her previous imaging enrolled a CT scan in 2017 showed the same collecting system dilation.  She has no right flank pain.  Her urinalysis is clear today.  She was also evaluated by Dr. Madera in 2017 underwent cystoscopy with bilateral retrograde pyelogram for suspected bladder lesion.  There was no lesion seen in the bladder and according to his notes the retrograde pyelogram was unremarkable.  Urine dipstick shows negative for all components except trace leukocyte.    Past Medical History:   Diagnosis Date    Blood pressure elevated without history of HTN     Cancer     renal cell    Cervicalgia     Charcot-Khadijah-Tooth disease     COPD (chronic obstructive pulmonary disease)     Depression     Emphysema (subcutaneous) (surgical) resulting from a procedure     Esophageal reflux     Foot pain     High cholesterol     Hyperlipidemia     Hypertension     Low back pain     Malaise and fatigue     Muscular dystrophy     Osteopenia     Pleurisy     Pneumonia     Respiratory distress     Rhinitis, allergic     Snoring     Tobacco abuse     Vibrio cholera infection     Wrist fracture, left 12/2016    patient  slipped on steps and fx (L) wrist     Past Surgical History:   Procedure Laterality Date    APPENDECTOMY      COLONOSCOPY      CORONARY ANGIOGRAPHY  8/10/2021    Procedure: ANGIOGRAM, CORONARY ARTERY;  Surgeon: Joni Reynolds MD;  Location: [facility] CATH;  Service: Cardiovascular;;    DIAGNOSTIC LAPAROSCOPY N/A 6/6/2019    Procedure: LAPAROSCOPY, DIAGNOSTIC;  Surgeon: Adelfo Bear MD;  Location: [facility] OR;  Service: General;  Laterality: N/A;    HYSTERECTOMY      Partial @ age 48    KIDNEY SURGERY Left 06/2017    removal of cancerous tumor    LEFT HEART CATHETERIZATION  8/10/2021    Procedure: Left heart cath;  Surgeon: Joni Reynolds MD;  Location: [facility] CATH;  Service: Cardiovascular;;       Current Outpatient Medications:     albuterol (PROVENTIL/VENTOLIN HFA) 90 mcg/actuation inhaler, Inhale 2 puffs into the lungs every 4 (four) hours as needed for Shortness of Breath. Rescue, Disp: 17 g, Rfl: 3    fluticasone-umeclidin-vilanter (TRELEGY ELLIPTA) 100-62.5-25 mcg DsDv, INHALE 1 PUFF INTO THE LUNGS EVERY MORNING, Disp: 60 each, Rfl: 6    lisinopriL (PRINIVIL,ZESTRIL) 40 MG tablet, Take 1 tablet (40 mg total) by mouth once daily., Disp: 90 tablet, Rfl: 3    meclizine (ANTIVERT) 25 mg tablet, 1/2-1 tid prn dizziness (Patient taking differently: Take 12.5 mg by mouth 3 (three) times daily as needed. 1/2-1 tid prn dizziness), Disp: 90 tablet, Rfl: 1    rosuvastatin (CRESTOR) 20 MG tablet, TAKE 1 TABLET(20 MG) BY MOUTH ONCE DAILY, Disp: 30 tablet, Rfl: 11    sertraline (ZOLOFT) 100 MG tablet, TAKE 2 TABLETS BY MOUTH EVERY DAY, Disp: 60 tablet, Rfl: 11    traZODone (DESYREL) 50 MG tablet, TAKE 1 TABLET BY MOUTH EVERY NIGHT AT BEDTIME FOR DEPRESSION AND REST, Disp: 90 tablet, Rfl: 3      Review of Systems   Constitutional:  Negative for fever.   Eyes:  Negative for visual disturbance.   Respiratory:  Negative for shortness of breath.    Cardiovascular:  Negative for chest pain.   Gastrointestinal:  Negative for nausea.    Genitourinary:  Negative for dysuria, flank pain and hematuria.   Musculoskeletal:  Positive for arthralgias and back pain. Negative for gait problem.   Skin:  Negative for rash.   Neurological:  Negative for seizures.   Psychiatric/Behavioral:  Negative for confusion.      Objective:      Physical Exam  Vitals reviewed.   Constitutional:       Appearance: She is well-developed.   HENT:      Head: Normocephalic.   Eyes:      Conjunctiva/sclera: Conjunctivae normal.   Cardiovascular:      Rate and Rhythm: Normal rate.   Pulmonary:      Effort: Pulmonary effort is normal.   Abdominal:      General: There is no distension.      Palpations: Abdomen is soft. There is no mass.      Tenderness: There is no abdominal tenderness. There is no right CVA tenderness or left CVA tenderness.   Musculoskeletal:      Cervical back: Normal range of motion.   Skin:     General: Skin is warm and dry.      Findings: No erythema or rash.   Neurological:      Mental Status: She is alert and oriented to person, place, and time.   Psychiatric:         Behavior: Behavior normal.       Assessment:       1. Renal mass    2. Hydronephrosis, unspecified hydronephrosis type          Plan:       Renal mass  -     US Retroperitoneal Complete; Future; Expected date: 01/18/2024    Hydronephrosis, unspecified hydronephrosis type  -     Ambulatory referral/consult to Urology  -     POCT Urinalysis(Instrument)  -     US Retroperitoneal Complete; Future; Expected date: 01/18/2024      She has mild dilation of the right collecting system which has been a chronic finding dating back to at least 2017.  I suspect she has a mild right UPJ obstruction.  He is asymptomatic her renal function is normal and this is unchanged.  We discussed further workup with nuclear medicine studies versus observation and she favors a more conservative approach.  We also discussed treatment options for the small renal mass.  This appears mostly unchanged in the last 6 years.   We discussed continued observation versus percutaneous ablation versus partial nephrectomy.  She wants to observe for now.  I recommend follow-up in 1 year with repeat kidney ultrasound.

## 2024-01-18 ENCOUNTER — HOSPITAL ENCOUNTER (OUTPATIENT)
Dept: RADIOLOGY | Facility: HOSPITAL | Age: 84
Discharge: HOME | End: 2024-01-18
Attending: UROLOGY
Payer: MEDICARE

## 2024-01-18 DIAGNOSIS — N13.30 HYDRONEPHROSIS, UNSPECIFIED HYDRONEPHROSIS TYPE: ICD-10-CM

## 2024-01-18 DIAGNOSIS — N28.89 RENAL MASS: ICD-10-CM

## 2024-01-18 PROCEDURE — 76770 US EXAM ABDO BACK WALL COMP: CPT | Mod: 26,,, | Performed by: RADIOLOGY

## 2024-01-18 PROCEDURE — 76770 US EXAM ABDO BACK WALL COMP: CPT | Mod: TC,PO

## 2024-01-22 ENCOUNTER — OFFICE VISIT (OUTPATIENT)
Dept: UROLOGY | Facility: CLINIC | Age: 84
End: 2024-01-22
Payer: MEDICARE

## 2024-01-22 VITALS — WEIGHT: 115.06 LBS | BODY MASS INDEX: 21.72 KG/M2 | HEIGHT: 61 IN

## 2024-01-22 DIAGNOSIS — N28.89 RENAL MASS: Primary | ICD-10-CM

## 2024-01-22 LAB
BILIRUBIN, UA POC OHS: NEGATIVE
BLOOD, UA POC OHS: NEGATIVE
CLARITY, UA POC OHS: CLEAR
COLOR, UA POC OHS: YELLOW
GLUCOSE, UA POC OHS: NEGATIVE
KETONES, UA POC OHS: NEGATIVE
LEUKOCYTES, UA POC OHS: NEGATIVE
NITRITE, UA POC OHS: NEGATIVE
PH, UA POC OHS: 7
PROTEIN, UA POC OHS: NEGATIVE
SPECIFIC GRAVITY, UA POC OHS: 1.01
UROBILINOGEN, UA POC OHS: 1

## 2024-01-22 PROCEDURE — 99214 OFFICE O/P EST MOD 30 MIN: CPT | Mod: S$GLB,,, | Performed by: UROLOGY

## 2024-01-22 PROCEDURE — 81003 URINALYSIS AUTO W/O SCOPE: CPT | Mod: QW,S$GLB,, | Performed by: UROLOGY

## 2024-01-22 PROCEDURE — 1159F MED LIST DOCD IN RCRD: CPT | Mod: CPTII,S$GLB,, | Performed by: UROLOGY

## 2024-01-22 PROCEDURE — 1126F AMNT PAIN NOTED NONE PRSNT: CPT | Mod: CPTII,S$GLB,, | Performed by: UROLOGY

## 2024-01-22 PROCEDURE — 1101F PT FALLS ASSESS-DOCD LE1/YR: CPT | Mod: CPTII,S$GLB,, | Performed by: UROLOGY

## 2024-01-22 PROCEDURE — 3288F FALL RISK ASSESSMENT DOCD: CPT | Mod: CPTII,S$GLB,, | Performed by: UROLOGY

## 2024-01-22 PROCEDURE — 99999 PR PBB SHADOW E&M-EST. PATIENT-LVL III: CPT | Mod: PBBFAC,,, | Performed by: UROLOGY

## 2024-01-22 NOTE — PROGRESS NOTES
Subjective:       Patient ID: Camila Steve is a 83 y.o. female.    Chief Complaint: Follow-up    HPI    83-year-old with mild right-sided hydronephrosis.  She has a complicated urologic history.  She is had history of bilateral renal lesions.  She underwent percutaneous ablation of a left renal lesion in 2017 at [facility].  The details are unknown but she was told the lesion was benign.  She also has a small exophytic partially enhancing right lower pole lesion.  It measures 1.3 cm and has been present since imaging in 2017 is not significantly changed.  Her renal function is normal with GFR greater than 60.  She has no right flank pain.  Her urinalysis is clear today.  She was also evaluated by Dr. Madera in 2017 underwent cystoscopy with bilateral retrograde pyelogram for suspected bladder lesion.  There was no lesion seen in the bladder and according to his notes the retrograde pyelogram was unremarkable.  Kidney ultrasound.  I reviewed the images with her.  The right lower pole lesion is unchanged.  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  It measures 1.4 cm.  There was no appreciable hydronephrosis on the right side.  Today she complains primarily of diarrhea.  Urine dipstick shows negative for all components.    Review of Systems   Constitutional:  Negative for fever.   Genitourinary:  Negative for dysuria and hematuria.       Objective:      Physical Exam  Vitals reviewed.   Constitutional:       Appearance: She is well-developed.   Pulmonary:      Effort: Pulmonary effort is normal. No respiratory distress.   Skin:     General: Skin is warm.   Neurological:      Mental Status: She is alert and oriented to person, place, and time.   Psychiatric:         Behavior: Behavior normal.         Assessment:       1. 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  I recommend continued observation.  Follow-up 2 years with repeat kidney  ultrasound.  I recommend she had fiber supplements for history of diarrhea.  Discussed with primary care doctor.     I personally reviewed the US images and made an independent interpretation of the test completed by another healthcare professional.

## 2024-02-16 PROBLEM — S52.91XD CLOSED FRACTURE OF RIGHT FOREARM WITH ROUTINE HEALING: Status: ACTIVE | Noted: 2024-02-16

## 2024-02-16 PROBLEM — S52.91XD CLOSED FRACTURE OF RIGHT FOREARM WITH ROUTINE HEALING: Status: RESOLVED | Noted: 2024-02-16 | Resolved: 2024-02-16

## 2024-02-16 PROBLEM — S02.609A: Status: ACTIVE | Noted: 2024-02-16

## 2024-02-16 PROBLEM — S52.91XA CLOSED RIGHT RADIAL FRACTURE: Status: ACTIVE | Noted: 2024-02-16

## 2024-02-16 PROBLEM — S52.91XA CLOSED RIGHT RADIAL FRACTURE: Status: RESOLVED | Noted: 2024-02-16 | Resolved: 2024-02-16

## 2024-07-05 PROBLEM — C64.2 RENAL CANCER, LEFT: Status: ACTIVE | Noted: 2024-07-05

## 2024-07-05 PROBLEM — Z90.5 HX OF PARTIAL NEPHRECTOMY: Status: ACTIVE | Noted: 2024-07-05

## 2024-07-05 PROBLEM — Z90.5 HX OF PARTIAL NEPHRECTOMY: Chronic | Status: ACTIVE | Noted: 2024-07-05

## 2024-07-11 PROBLEM — F33.42 RECURRENT MAJOR DEPRESSIVE DISORDER, IN FULL REMISSION: Chronic | Status: ACTIVE | Noted: 2019-04-23

## 2024-07-11 PROBLEM — H43.12 VITREOUS HEMORRHAGE, LEFT EYE: Status: ACTIVE | Noted: 2024-07-11

## 2024-07-11 PROBLEM — S02.609A FRACTURE, JAW: Status: RESOLVED | Noted: 2024-02-16 | Resolved: 2024-07-11
